# Patient Record
Sex: FEMALE
[De-identification: names, ages, dates, MRNs, and addresses within clinical notes are randomized per-mention and may not be internally consistent; named-entity substitution may affect disease eponyms.]

---

## 2022-04-19 ENCOUNTER — APPOINTMENT (OUTPATIENT)
Dept: ORTHOPEDIC SURGERY | Facility: CLINIC | Age: 57
End: 2022-04-19
Payer: COMMERCIAL

## 2022-04-19 VITALS — HEIGHT: 66 IN | BODY MASS INDEX: 29.73 KG/M2 | WEIGHT: 185 LBS

## 2022-04-19 DIAGNOSIS — M20.21 HALLUX RIGIDUS, RIGHT FOOT: ICD-10-CM

## 2022-04-19 DIAGNOSIS — E78.00 PURE HYPERCHOLESTEROLEMIA, UNSPECIFIED: ICD-10-CM

## 2022-04-19 PROBLEM — Z00.00 ENCOUNTER FOR PREVENTIVE HEALTH EXAMINATION: Status: ACTIVE | Noted: 2022-04-19

## 2022-04-19 PROCEDURE — 73630 X-RAY EXAM OF FOOT: CPT | Mod: RT

## 2022-04-19 PROCEDURE — 99204 OFFICE O/P NEW MOD 45 MIN: CPT

## 2022-04-19 NOTE — PHYSICAL EXAM
Name band; [Mild] : mild swelling of MTP joint/great toe [1st] : 1st [2+] : dorsalis pedis pulse: 2+ [] : no calf tenderness

## 2022-04-19 NOTE — HISTORY OF PRESENT ILLNESS
[Rest] : rest [Sitting] : sitting [Standing] : standing [Walking] : walking [Stairs] : stairs [Sudden] : sudden [5] : 5 [3] : 3 [Dull/Aching] : dull/aching [Localized] : localized [Intermittent] : intermittent [de-identified] : Pt is a 56 year old F who presents today for evaluation of their right foot. Pt states that for the past 4yrs after working out in a garage on a foam mat when she started having foot/toe pain and kept exercising through it. Under the care of podiatry who has been using CSI every 6 months. Still painful with activity. Pain localized along the dorsal great toe. Had XR taken which showed OA. Denies trauma/previous injury. No N/T. No formal treatment to date. WB in sneakers. \par  [] : Post Surgical Visit: no [[email or web address]] : L foot